# Patient Record
Sex: MALE | Race: WHITE
[De-identification: names, ages, dates, MRNs, and addresses within clinical notes are randomized per-mention and may not be internally consistent; named-entity substitution may affect disease eponyms.]

---

## 2018-12-22 ENCOUNTER — HOSPITAL ENCOUNTER (INPATIENT)
Dept: HOSPITAL 75 - ER | Age: 43
LOS: 1 days | Discharge: HOME | DRG: 312 | End: 2018-12-23
Attending: INTERNAL MEDICINE | Admitting: INTERNAL MEDICINE
Payer: MEDICARE

## 2018-12-22 VITALS — SYSTOLIC BLOOD PRESSURE: 124 MMHG | DIASTOLIC BLOOD PRESSURE: 94 MMHG

## 2018-12-22 VITALS — BODY MASS INDEX: 33.11 KG/M2 | WEIGHT: 175.37 LBS | HEIGHT: 61 IN

## 2018-12-22 VITALS — DIASTOLIC BLOOD PRESSURE: 80 MMHG | SYSTOLIC BLOOD PRESSURE: 107 MMHG

## 2018-12-22 DIAGNOSIS — M62.81: ICD-10-CM

## 2018-12-22 DIAGNOSIS — F41.9: ICD-10-CM

## 2018-12-22 DIAGNOSIS — I95.2: Primary | ICD-10-CM

## 2018-12-22 DIAGNOSIS — H91.3: ICD-10-CM

## 2018-12-22 DIAGNOSIS — F15.288: ICD-10-CM

## 2018-12-22 DIAGNOSIS — K59.09: ICD-10-CM

## 2018-12-22 DIAGNOSIS — Z74.01: ICD-10-CM

## 2018-12-22 DIAGNOSIS — R00.1: ICD-10-CM

## 2018-12-22 DIAGNOSIS — E11.9: ICD-10-CM

## 2018-12-22 DIAGNOSIS — I10: ICD-10-CM

## 2018-12-22 DIAGNOSIS — T46.4X5A: ICD-10-CM

## 2018-12-22 DIAGNOSIS — F63.81: ICD-10-CM

## 2018-12-22 DIAGNOSIS — Q76.1: ICD-10-CM

## 2018-12-22 DIAGNOSIS — E78.5: ICD-10-CM

## 2018-12-22 DIAGNOSIS — T46.6X5A: ICD-10-CM

## 2018-12-22 DIAGNOSIS — F32.9: ICD-10-CM

## 2018-12-22 DIAGNOSIS — F70: ICD-10-CM

## 2018-12-22 LAB
ALBUMIN SERPL-MCNC: 3.6 GM/DL (ref 3.2–4.5)
ALP SERPL-CCNC: 66 U/L (ref 40–136)
ALT SERPL-CCNC: 24 U/L (ref 0–55)
APTT BLD: 34 SEC (ref 24–35)
APTT PPP: YELLOW S
BACTERIA #/AREA URNS HPF: NEGATIVE /HPF
BASOPHILS # BLD AUTO: 0.1 10^3/UL (ref 0–0.1)
BASOPHILS NFR BLD AUTO: 1 % (ref 0–10)
BILIRUB SERPL-MCNC: 0.2 MG/DL (ref 0.1–1)
BILIRUB UR QL STRIP: NEGATIVE
BUN/CREAT SERPL: 31
CALCIUM SERPL-MCNC: 8.7 MG/DL (ref 8.5–10.1)
CHLORIDE SERPL-SCNC: 110 MMOL/L (ref 98–107)
CO2 SERPL-SCNC: 21 MMOL/L (ref 21–32)
CREAT SERPL-MCNC: 0.75 MG/DL (ref 0.6–1.3)
EOSINOPHIL # BLD AUTO: 0.7 10^3/UL (ref 0–0.3)
EOSINOPHIL NFR BLD AUTO: 7 % (ref 0–10)
ERYTHROCYTE [DISTWIDTH] IN BLOOD BY AUTOMATED COUNT: 12.1 % (ref 10–14.5)
FIBRINOGEN PPP-MCNC: CLEAR MG/DL
GFR SERPLBLD BASED ON 1.73 SQ M-ARVRAT: > 60 ML/MIN
GLUCOSE SERPL-MCNC: 129 MG/DL (ref 70–105)
GLUCOSE UR STRIP-MCNC: NEGATIVE MG/DL
HCT VFR BLD CALC: 38 % (ref 40–54)
HGB BLD-MCNC: 13.1 G/DL (ref 13.3–17.7)
INR PPP: 1 (ref 0.8–1.4)
KETONES UR QL STRIP: NEGATIVE
LEUKOCYTE ESTERASE UR QL STRIP: NEGATIVE
LYMPHOCYTES # BLD AUTO: 4.2 X 10^3 (ref 1–4)
LYMPHOCYTES NFR BLD AUTO: 43 % (ref 12–44)
MAGNESIUM SERPL-MCNC: 2 MG/DL (ref 1.8–2.4)
MANUAL DIFFERENTIAL PERFORMED BLD QL: NO
MCH RBC QN AUTO: 31 PG (ref 25–34)
MCHC RBC AUTO-ENTMCNC: 34 G/DL (ref 32–36)
MCV RBC AUTO: 92 FL (ref 80–99)
MONOCYTES # BLD AUTO: 0.7 X 10^3 (ref 0–1)
MONOCYTES NFR BLD AUTO: 7 % (ref 0–12)
MYOGLOBIN SERPL-MCNC: 28.8 NG/ML (ref 10–92)
NEUTROPHILS # BLD AUTO: 4.3 X 10^3 (ref 1.8–7.8)
NEUTROPHILS NFR BLD AUTO: 44 % (ref 42–75)
NITRITE UR QL STRIP: NEGATIVE
PH UR STRIP: 6.5 [PH] (ref 5–9)
PLATELET # BLD: 163 10^3/UL (ref 130–400)
PMV BLD AUTO: 9.8 FL (ref 7.4–10.4)
POTASSIUM SERPL-SCNC: 3.9 MMOL/L (ref 3.6–5)
PROT SERPL-MCNC: 6.1 GM/DL (ref 6.4–8.2)
PROT UR QL STRIP: NEGATIVE
PROTHROMBIN TIME: 13.3 SEC (ref 12.2–14.7)
RBC # BLD AUTO: 4.18 10^6/UL (ref 4.35–5.85)
RBC #/AREA URNS HPF: (no result) /HPF
SODIUM SERPL-SCNC: 139 MMOL/L (ref 135–145)
SP GR UR STRIP: 1.01 (ref 1.02–1.02)
UROBILINOGEN UR-MCNC: NORMAL MG/DL
WBC # BLD AUTO: 9.8 10^3/UL (ref 4.3–11)
WBC #/AREA URNS HPF: (no result) /HPF

## 2018-12-22 PROCEDURE — 87804 INFLUENZA ASSAY W/OPTIC: CPT

## 2018-12-22 PROCEDURE — 87088 URINE BACTERIA CULTURE: CPT

## 2018-12-22 PROCEDURE — 93005 ELECTROCARDIOGRAM TRACING: CPT

## 2018-12-22 PROCEDURE — 80053 COMPREHEN METABOLIC PANEL: CPT

## 2018-12-22 PROCEDURE — 83874 ASSAY OF MYOGLOBIN: CPT

## 2018-12-22 PROCEDURE — 82962 GLUCOSE BLOOD TEST: CPT

## 2018-12-22 PROCEDURE — 71046 X-RAY EXAM CHEST 2 VIEWS: CPT

## 2018-12-22 PROCEDURE — 84484 ASSAY OF TROPONIN QUANT: CPT

## 2018-12-22 PROCEDURE — 36415 COLL VENOUS BLD VENIPUNCTURE: CPT

## 2018-12-22 PROCEDURE — 85610 PROTHROMBIN TIME: CPT

## 2018-12-22 PROCEDURE — 96365 THER/PROPH/DIAG IV INF INIT: CPT

## 2018-12-22 PROCEDURE — 87040 BLOOD CULTURE FOR BACTERIA: CPT

## 2018-12-22 PROCEDURE — 85730 THROMBOPLASTIN TIME PARTIAL: CPT

## 2018-12-22 PROCEDURE — 96361 HYDRATE IV INFUSION ADD-ON: CPT

## 2018-12-22 PROCEDURE — 81000 URINALYSIS NONAUTO W/SCOPE: CPT

## 2018-12-22 PROCEDURE — 83735 ASSAY OF MAGNESIUM: CPT

## 2018-12-22 PROCEDURE — 80048 BASIC METABOLIC PNL TOTAL CA: CPT

## 2018-12-22 PROCEDURE — 83605 ASSAY OF LACTIC ACID: CPT

## 2018-12-22 PROCEDURE — 93041 RHYTHM ECG TRACING: CPT

## 2018-12-22 PROCEDURE — 74018 RADEX ABDOMEN 1 VIEW: CPT

## 2018-12-22 PROCEDURE — 85025 COMPLETE CBC W/AUTO DIFF WBC: CPT

## 2018-12-22 RX ADMIN — SODIUM CHLORIDE SCH MLS/HR: 900 INJECTION, SOLUTION INTRAVENOUS at 22:43

## 2018-12-22 NOTE — XMS REPORT
Continuity of Care Document

 Created on: 2018



CLEMENTE ALEX

External Reference #: [id number]

: 1975

Sex: Male



Demographics







 Address  [address]

[place]  [address]

 

 Home Phone  [phone] x

 

 Preferred Language  Unknown

 

 Marital Status  Unknown

 

 Yazidism Affiliation  Unknown

 

 Race  Unknown

 

 Ethnic Group  Unknown





Author







 Author  [facility]

 

 Address  Unknown

 

 Phone  Unavailable



              



Allergies

      



There is no data.                  



Medications

      



There is no data.                  



Problems

      





 Date Dx Coded            Attending            Type            Code            
Diagnosis            Diagnosed By        

 

 2014            SUNIL GAN                         300.3       
     AN OBCESS COMP DIS                     

 

 2014            SUNIL GAN                         312.34      
      INTERMITTENT EXPLOSIVE DISORDER                     

 

 2014            SUNIL GAN                         318.1       
     SEVERE MENTAL RETARDATION                     

 

 2014            SUNIL GAN                         V58.69      
      MEDICATION HIGH RISK                     

 

 2014            SUNIL GAN                         300.3       
     AN OBCESS COMP DIS                     

 

 2014            SUNIL GAN                         312.34      
      INTERMITTENT EXPLOSIVE DISORDER                     

 

 2014            SUNIL GAN                         318.1       
     SEVERE MENTAL RETARDATION                     

 

 2014            SUNIL GAN                         V58.69      
      MEDICATION HIGH RISK                     



                                



Procedures

      



There is no data.                  



Results

      



There is no data.              



Encounters

      





 ACCT No.            Visit Date/Time            Discharge            Status    
        Pt. Type            Provider            Facility            Loc./Unit  
          Complaint        

 

 [id number]            2015 15:35:00            2015 23:59:59          
  [facility]            Outpatient            SUNIL GAN                     
                          

 

 [id number]            2014 11:22:00            2014 23:59:59          
  [facility]            Outpatient            SUNIL GAN

## 2018-12-22 NOTE — DIAGNOSTIC IMAGING REPORT
INDICATION: Lethargic.



COMPARISON: 11/22/2016.



TECHNIQUE: Two radiographs of the chest dated December 22, 2018. 



FINDINGS: The cardiac silhouette is within normal limits in size.

No significant pulmonary vascular congestion. The lungs are clear

of focal pulmonary opacity. No pleural effusion. No pneumothorax.

Surgical clips are within the right upper quadrant of the

abdomen. No acute osseous abnormality.



IMPRESSION: Stable examination without acute cardiopulmonary

abnormality. 



Dictated by: 



  Dictated on workstation # [id number]

## 2018-12-22 NOTE — ED GENERAL
General


Stated Complaint:  LETHARGIC/WEAK


Source of Information:  Patient, Caregiver, Old Records


Exam Limitations:  Physical Impairments





History of Present Illness


Date Seen by Provider:  Dec 22, 2018


Time Seen by Provider:  18:00


Initial Comments


The patient presents to ER by private conveyance with his house caregiver 

because they noticed today he was not acting himself is been very tired and 

wanting to sleep all day and at dinner was dropping his utensils. He's not been 

acting sick lately nor there any sick contacts in the home where he lives. He 

does have a history of being functionally destined nonverbal. The has a 

guardian who the caregivers trying to contact. They deny that he's been having 

any nausea vomiting diarrhea fevers chills sweats pain of any sort or decreased 

appetite until this evening. He's not been having a cough, runny nose rash or 

other concern that they're aware of other than his acting weak and tired. He 

has a history of hypertension and some psychiatric medications that he takes. 

They state he's been taking his medications appropriately since they deliver 

them to him. No known family history as they don't do anything about his 

family. No history of heart attack, stroke, or recent antibiotic use. They 

noticed the last year he's lost some weight but they are not sure how much.


Last bowel movement was just prior to arrival.





Allergies and Home Medications


Allergies


Coded Allergies:  


     No Known Drug Allergies (Unverified , 11)





Home Medications


Hydrocodone/Acetaminophen 1 Each Tablet, 1-2 EACH PO Q4H


   Prescribed by: REBECCA HUANG on 16 1002





Patient Home Medication List


Home Medication List Reviewed:  Yes





Review of Systems


Review of Systems


Constitutional:  see HPI (history of present illness given by caregivers as the 

patient is nonverbal); No chills, No diaphoresis, No fever, No malaise


EENTM:  No ear discharge, No ear pain, No eye pain, No tearing, No dental 

problems, No hoarseness, No nose congestion, No throat pain


Respiratory:  No cough, No short of breath, No wheezing


Cardiovascular:  No chest pain, No edema, No Hx of Intervention, No syncope, No 

vascular heart diseas


Gastrointestinal:  No abdominal pain, No constipation, No diarrhea, No vomiting


Genitourinary:  No dysuria, No frequency, No hematuria


Musculoskeletal:  No back pain, No joint pain


Skin:  No pruritus, No rash


Psychiatric/Neurological:  Denies Headache, Denies Numbness





Past Medical-Social-Family Hx


Patient Social History


Alcohol Use:  Denies Use


Recreational Drug Use:  No


Smoking Status:  Never a Smoker


Recent Foreign Travel:  No


Contact w/Someone Who Travel:  No





Seasonal Allergies


Seasonal Allergies:  No





Past Medical History


Gall Bladder Disease


Hearing Impairment:  Deaf





Physical Exam-Suspected Sepsis


Physical Exam


Vital Signs





Vital Signs - First Documented








 18





 18:04


 


Temp 95.9


 


Pulse 57


 


Resp 20


 


B/P (MAP) 82/53 (63)


 


Pulse Ox 94


 


O2 Delivery Room Air





Capillary Refill :


Height, Weight, BMI


Height: 5'1.00"


Weight: 290lbs. 0.0oz. 131.804274im; 48.25 BMI


Method:Stated


General Appearance:  Mild Distress, Obese


Eyes:  Bilateral Eye Normal Inspection, Bilateral Eye PERRL, Bilateral Eye EOMI


HEENT:  PERRL/EOMI, TMs Normal, Normal ENT Inspection, Pharynx Normal, Moist 

Mucous Membranes


Neck:  Full Range of Motion, Normal Inspection, Non Tender


Respiratory:  Chest Non Tender, Lungs Clear, Normal Breath Sounds, No Accessory 

Muscle Use, No Respiratory Distress


Cardiovascular:  Regular Rate, Rhythm, No Edema, Normal Peripheral Pulses


Gastrointestinal:  Normal Bowel Sounds, No Organomegaly, Non Tender, Soft


Extremity:  Normal Capillary Refill, Normal Inspection


Neurologic/Psychiatric:  Alert, Normal Mood/Affect (placid), Other (nonverbal 

but cooperative)


Skin:  normal color, warm/dry





Focused Exam


Lactate Level


18 18:37: Lactic Acid Level 0.86





Lactic Acid Level





Laboratory Tests








Test


 18


18:37


 


Lactic Acid Level


 0.86 MMOL/L


(0.50-2.00)











Progress/Results/Core Measures


Suspected Sepsis


SIRS


Temperature: 


Pulse:  


Respiratory Rate: 


 


Laboratory Tests


18 18:37: White Blood Count 9.8


Blood Pressure  / 


Mean: 


 





18 18:37: Lactic Acid Level 0.86


Laboratory Tests


18 18:37: 


Creatinine 0.75, INR Comment 1.0, Platelet Count 163, Total Bilirubin 0.2








Results/Orders


Lab Results





Laboratory Tests








Test


 18


18:37 18


18:52 18


19:41 Range/Units


 


 


White Blood Count


 9.8 


 


 


 4.3-11.0


10^3/uL


 


Red Blood Count


 4.18 L


 


 


 4.35-5.85


10^6/uL


 


Hemoglobin 13.1 L   13.3-17.7  G/DL


 


Hematocrit 38 L   40-54  %


 


Mean Corpuscular Volume 92    80-99  FL


 


Mean Corpuscular Hemoglobin 31    25-34  PG


 


Mean Corpuscular Hemoglobin


Concent 34 


 


 


 32-36  G/DL





 


Red Cell Distribution Width 12.1    10.0-14.5  %


 


Platelet Count


 163 


 


 


 130-400


10^3/uL


 


Mean Platelet Volume 9.8    7.4-10.4  FL


 


Neutrophils (%) (Auto) 44    42-75  %


 


Lymphocytes (%) (Auto) 43    12-44  %


 


Monocytes (%) (Auto) 7    0-12  %


 


Eosinophils (%) (Auto) 7    0-10  %


 


Basophils (%) (Auto) 1    0-10  %


 


Neutrophils # (Auto) 4.3    1.8-7.8  X 10^3


 


Lymphocytes # (Auto) 4.2 H   1.0-4.0  X 10^3


 


Monocytes # (Auto) 0.7    0.0-1.0  X 10^3


 


Eosinophils # (Auto)


 0.7 H


 


 


 0.0-0.3


10^3/uL


 


Basophils # (Auto)


 0.1 


 


 


 0.0-0.1


10^3/uL


 


Prothrombin Time 13.3    12.2-14.7  SEC


 


INR Comment 1.0    0.8-1.4  


 


Activated Partial


Thromboplast Time 34 


 


 


 24-35  SEC





 


Sodium Level 139    135-145  MMOL/L


 


Potassium Level 3.9    3.6-5.0  MMOL/L


 


Chloride Level 110 H     MMOL/L


 


Carbon Dioxide Level 21    21-32  MMOL/L


 


Anion Gap 8    5-14  MMOL/L


 


Blood Urea Nitrogen 23 H   7-18  MG/DL


 


Creatinine


 0.75 


 


 


 0.60-1.30


MG/DL


 


Estimat Glomerular Filtration


Rate > 60 


 


 


  





 


BUN/Creatinine Ratio 31     


 


Glucose Level 129 H     MG/DL


 


Lactic Acid Level


 0.86 


 


 


 0.50-2.00


MMOL/L


 


Calcium Level 8.7    8.5-10.1  MG/DL


 


Corrected Calcium 9.0    8.5-10.1  MG/DL


 


Magnesium Level 2.0    1.8-2.4  MG/DL


 


Total Bilirubin 0.2    0.1-1.0  MG/DL


 


Aspartate Amino Transf


(AST/SGOT) 23 


 


 


 5-34  U/L





 


Alanine Aminotransferase


(ALT/SGPT) 24 


 


 


 0-55  U/L





 


Alkaline Phosphatase 66      U/L


 


Myoglobin


 28.8 


 


 


 10.0-92.0


NG/ML


 


Troponin I < 0.30    <0.30  NG/ML


 


Total Protein 6.1 L   6.4-8.2  GM/DL


 


Albumin 3.6    3.2-4.5  GM/DL


 


Glucometer  104     MG/DL


 


Urine Color   YELLOW   


 


Urine Clarity   CLEAR   


 


Urine pH   6.5  5-9  


 


Urine Specific Gravity   1.010 L 1.016-1.022  


 


Urine Protein   NEGATIVE  NEGATIVE  


 


Urine Glucose (UA)   NEGATIVE  NEGATIVE  


 


Urine Ketones   NEGATIVE  NEGATIVE  


 


Urine Nitrite   NEGATIVE  NEGATIVE  


 


Urine Bilirubin   NEGATIVE  NEGATIVE  


 


Urine Urobilinogen   NORMAL  NORMAL  MG/DL


 


Urine Leukocyte Esterase   NEGATIVE  NEGATIVE  


 


Urine RBC (Auto)   NEGATIVE  NEGATIVE  


 


Urine RBC   NONE   /HPF


 


Urine WBC   NONE   /HPF


 


Urine Crystals   NONE   /LPF


 


Urine Bacteria   NEGATIVE   /HPF


 


Urine Casts   NONE   /LPF


 


Urine Mucus   NEGATIVE   /LPF


 


Urine Culture Indicated   NO   








Micro Results





Microbiology


18 Influenza Types A,B Antigen (NAJMA) - Final, Complete


           





My Orders





Orders - NARCISA CHOW


Cbc With Automated Diff (18 18:09)


Comprehensive Metabolic Panel (18 18:09)


Blood Culture (18 18:09)


Sputum Culture (18 18:09)


Urinalysis (18 18:09)


Urine Culture (18 18:09)


Protime With Inr (18 18:09)


Partial Thromboplastin Time (18 18:09)


Saline Lock/Iv-Start (18 18:09)


Saline Lock/Iv-Start (18 18:09)


Vital Signs Adult Sepsis Patie Q15M (18 18:09)


O2 (18 18:09)


Remove Rings In Anticipation O (18 18:09)


Lactic Acid Analyzer (18 18:09)


Influenza A And B Antigens (18 18:09)


Ns Iv 1000 Ml (Sodium Chloride 0.9%) (18 18:15)


Piperacillin Sodium/Tazobactam (Zosyn Vi (18 18:15)


Ekg Tracing (18 18:09)


Monitor-Rhythm Ecg Trace Only (18 18:09)


Aspirin Chewable Tablet (Baby Aspirin Ch (18 18:15)


Chest Pa/Lat (2 View) (18 18:09)


Accucheck Stat ONCE (18 18:50)


Cardiac Profile 1 (18 18:37)


Magnesium (18 18:37)


Myoglobin Serum (18 18:37)


Abdomen/Kub 1view (18 20:16)





Medications Given in ED





Current Medications








 Medications  Dose


 Ordered  Sig/Farrukh


 Route  Start Time


 Stop Time Status Last Admin


Dose Admin


 


 Aspirin  324 mg  ONCE  ONCE


 PO  18 18:15


 18 18:17 DC 18 18:30


324 MG


 


 Piperacillin Sod/


 Tazobactam Sod


 4.5 gm/Sodium


 Chloride  100 ml @ 


 200 mls/hr  ONCE  ONCE


 IV  18 18:15


 18 18:44 DC 18 19:59


200 MLS/HR


 


 Sodium Chloride  2,000 ml @ 


 2,000 mls/hr  ONCE  ONCE


 IV  18 18:15


 18 19:14 DC 18 18:30


2,000 MLS/HR








Vital Signs/I&O











 18





 18:04 20:03


 


Temp 95.9 95.4


 


Pulse 57 44


 


Resp 20 16


 


B/P (MAP) 82/53 (63) 92/60


 


Pulse Ox 94 95


 


O2 Delivery Room Air Room Air





Capillary Refill :


Progress Note #1:  


   Time:  18:22


Progress Note


Not much history to go on; the patient is found to be hypotensive initially 

with a systolic of 85 however blood pressure was noted to be 129/96 after 

obtaining flu swab and IVs which irritated the patient. His heart rates was in 

the 50s and he is not on a beta blocker, digoxin etc. so it could be artificial 

hypotension. He got up and walked over got in the chair to go to radiology with 

one-person standby assist. His temperature is low sonwe are still going to 

treat it like sepsis until we see otherwise. He has not started his IV fluids 

yet so when he gets back from radiology we will recheck his blood pressure 

couple times and if it's not hypotensive then this would not represent shock. We

'll also considering possibility of cardiac so we obtain an EKG we'll give him 

324 mg of aspirin and get a troponin. He has not intimated any kind of chest 

pain rather it was merely the presence of hypotension with no good history that 

prompted this workup. Using an ideal body weight of 150 pounds and put him 

right at 2 L of saline for 30 mils per kilogram. He has no edema, JVD or 

orthopnea and clinically he does appear to be mildly dry but not excessively so.





2016 Dr. Huang the care of him for gallbladder surgery and a history and 

physical reveals a past medical history of mild mental retardation with Klippel-

Feil syndrome, Obsessive-compulsive disorder, intermittent explosive disorder, 

deaf-mute doesn't, chronic constipation, high cholesterol, diabetes mellitus. 

His medical reconciliation does not include any insulin. No other surgical 

history. No smoking or alcohol use.


Progress Note #2:  


   Time:  18:35


Progress Note


Repeat blood pressure still demonstrate hypotension. His EKG today looks okay 

but he does have a history of possible atrial flutter seen on an EKG couple 

years ago. He showing no neurologic symptoms but is difficult to do a complete 

examination given his mutism. He has no drooping of his face or difficulty with 

one side of his body. The other. Strength and motor are equal bilateral.


Progress Note #3:  


   Time:  19:27


Progress Note


Bedside blood sugar unremarkable 104. Heart rate still remains low in the mid 

40s but his blood pressure has improved significantly by about 20 mmHg systolic 

the right now 107/67. Could be a cardiac source of his hypotension. Troponin 

and other lab work unrevealing. By the time he gets his first 2 L in if he 

still not able to produce a urine then we'll collect a straight catheter 

specimen.


Progress Note #4:  


   Time:  20:13


Progress Note


Patient's blood pressure is 116/93 and dental responded to fluid bolus 

challenge. He doesn't appear to be in any kind of distress right now on 

reexamination. On focused exam at this time is markedly improved. The I do 

think it be wise to hold onto him in the hospital do some further workup to 

find out why he is having inappropriate bradycardia and hypertension if it was 

from an infectious source or if there is a cardiogenic source of his problems 

today. Would also consider the possibility he may have ingested something but I 

as of yet have not been able to discern by history what that might be. I would 

continue the antibiotics until we were certain there is no infectious source of 

his symptoms. He does have a history of constipation so we will obtain a KUB 1 

view just to see if he has a large stool load.





ECG


Initial ECG Impression Date:  Dec 22, 2018


Initial ECG Impression Time:  18:09


Initial ECG Rate:  70


Initial ECG Rhythm:  Normal Sinus


Initial ECG Intervals:  Normal


Initial ECG Impression:  Normal, Nonspecific Changes


Initial ECG Comparisson:  Changed


Comment


Previous EKG from 2016 may have shown atrial flutter versus artifact. Today he 

is in a normal sinus rhythm without any ST changes.





Diagnostic Imaging





   Diagonstic Imaging:  Xray


   Plain Films/CT/US/NM/MRI:  chest (2v)


Comments


 [facility].


 [place], [place]





NAME:   LEONEL ALEX


[facility] REC#:   [id number]


ACCOUNT#:   [id number]


PT STATUS:   REG ER


:   1975


PHYSICIAN:   NARCISA CHOW MD


ADMIT DATE:   18/ER


 ***Draft***


Date of Exam:18





CHEST PA/LAT (2 VIEW)








INDICATION: Lethargic.





COMPARISON: 2016.





TECHNIQUE: Two radiographs of the chest dated 2018. 





FINDINGS: The cardiac silhouette is within normal limits in size.


No significant pulmonary vascular congestion. The lungs are clear


of focal pulmonary opacity. No pleural effusion. No pneumothorax.


Surgical clips are within the right upper quadrant of the


abdomen. No acute osseous abnormality.





IMPRESSION: Stable examination without acute cardiopulmonary


abnormality. 





  Dictated on workstation # [id number]








Dict:   18


Trans:   18


[facility] [id number]





Interpreted by:     CHIDI HORVATH MD


Electronically signed by:


   Reviewed:  Reviewed by Me








   Diagonstic Imaging:  Xray


   Plain Films/CT/US/NM/MRI:  abdomen (1 view KUB)


Comments


Unremarkable bowel gas pattern.


   Reviewed:  Reviewed by Me





Departure


Communication (Admissions)


Time/Spoke to Admitting Phy:  20:30


Dr Shipley; discussed case lab EKG troponin and x-rays. She would like the 

patient on cardiac stepdown with telemetry and she agrees to accept him.


Time/Spoke to Consulting Phy:  20:35


Dr. Tatum; discussed case lab imaging findings he agrees to consult.





Impression





 Primary Impression:  


 Hypotension


 Qualified Codes:  I95.0 - Idiopathic hypotension


 Additional Impression:  


 Bradycardia with 41-50 beats per minute


Disposition:   ADMITTED AS INPATIENT


Condition:  Stable





Admissions


Decision to Admit Reason:  Admit from ER (General)


Decision to Admit/Date:  Dec 22, 2018


Time/Decision to Admit Time:  20:30





Departure-Patient Inst.


Referrals:  


KAYLA DE LA CRUZ DO (PCP/Family)


Primary Care Physician





Copy


Copies To 1:   KAYLA DE LA CRUZ TITUS J Dec 22, 2018 18:22

## 2018-12-22 NOTE — DIAGNOSTIC IMAGING REPORT
INDICATION: Lethargic



COMPARISON: None available.



TECHNIQUE: Single radiograph of the abdomen dated 12/22/2018.



FINDINGS: Surgical clips are present within the right upper

quadrant of the abdomen. Gas and stool is identified throughout

the colon. No dilated loops of small bowel. No differential

air-fluid levels. No free air. [place] left curvature of the spine.

No acute osseous abnormality.



IMPRESSION: No acute abnormality.



Dictated by: 



  Dictated on workstation # [id number]

## 2018-12-22 NOTE — XMS REPORT
Continuity of Care Document

 Created on: 2018



CLEMENTE ALEX

External Reference #: [id number]

: 1975

Sex: Male



Demographics







 Address  [address]

[place]  [address]

 

 Home Phone  [phone] x

 

 Preferred Language  Unknown

 

 Marital Status  Unknown

 

 Zoroastrian Affiliation  Unknown

 

 Race  Unknown

 

 Ethnic Group  Unknown





Author







 Author  [facility]

 

 Address  Unknown

 

 Phone  Unavailable



              



Allergies

      



There is no data.                  



Medications

      



There is no data.                  



Problems

      





 Date Dx Coded            Attending            Type            Code            
Diagnosis            Diagnosed By        

 

 2014            SUNIL GAN                         300.3       
     AN OBCESS COMP DIS                     

 

 2014            SUNIL GAN                         312.34      
      INTERMITTENT EXPLOSIVE DISORDER                     

 

 2014            SUNIL GAN                         318.1       
     SEVERE MENTAL RETARDATION                     

 

 2014            SUNIL GAN                         V58.69      
      MEDICATION HIGH RISK                     

 

 2014            SUNIL GAN                         300.3       
     AN OBCESS COMP DIS                     

 

 2014            SUNIL GAN                         312.34      
      INTERMITTENT EXPLOSIVE DISORDER                     

 

 2014            SUNIL GAN                         318.1       
     SEVERE MENTAL RETARDATION                     

 

 2014            SUNIL GAN                         V58.69      
      MEDICATION HIGH RISK                     



                                



Procedures

      



There is no data.                  



Results

      



There is no data.              



Encounters

      





 ACCT No.            Visit Date/Time            Discharge            Status    
        Pt. Type            Provider            Facility            Loc./Unit  
          Complaint        

 

 [id number]            2015 15:35:00            2015 23:59:59          
  [facility]            Outpatient            SUNIL GAN                     
                          

 

 [id number]            2014 11:22:00            2014 23:59:59          
  [facility]            Outpatient            SUNIL GAN

## 2018-12-23 VITALS — DIASTOLIC BLOOD PRESSURE: 63 MMHG | SYSTOLIC BLOOD PRESSURE: 114 MMHG

## 2018-12-23 VITALS — SYSTOLIC BLOOD PRESSURE: 106 MMHG | DIASTOLIC BLOOD PRESSURE: 55 MMHG

## 2018-12-23 VITALS — SYSTOLIC BLOOD PRESSURE: 109 MMHG | DIASTOLIC BLOOD PRESSURE: 61 MMHG

## 2018-12-23 VITALS — DIASTOLIC BLOOD PRESSURE: 62 MMHG | SYSTOLIC BLOOD PRESSURE: 121 MMHG

## 2018-12-23 VITALS — DIASTOLIC BLOOD PRESSURE: 54 MMHG | SYSTOLIC BLOOD PRESSURE: 114 MMHG

## 2018-12-23 LAB
BASOPHILS # BLD AUTO: 0 10^3/UL (ref 0–0.1)
BASOPHILS NFR BLD AUTO: 1 % (ref 0–10)
BUN/CREAT SERPL: 26
CALCIUM SERPL-MCNC: 8.2 MG/DL (ref 8.5–10.1)
CHLORIDE SERPL-SCNC: 118 MMOL/L (ref 98–107)
CO2 SERPL-SCNC: 19 MMOL/L (ref 21–32)
CREAT SERPL-MCNC: 0.7 MG/DL (ref 0.6–1.3)
EOSINOPHIL # BLD AUTO: 0.6 10^3/UL (ref 0–0.3)
EOSINOPHIL NFR BLD AUTO: 7 % (ref 0–10)
ERYTHROCYTE [DISTWIDTH] IN BLOOD BY AUTOMATED COUNT: 12 % (ref 10–14.5)
GFR SERPLBLD BASED ON 1.73 SQ M-ARVRAT: > 60 ML/MIN
GLUCOSE SERPL-MCNC: 92 MG/DL (ref 70–105)
HCT VFR BLD CALC: 35 % (ref 40–54)
HGB BLD-MCNC: 11.8 G/DL (ref 13.3–17.7)
LYMPHOCYTES # BLD AUTO: 3.6 X 10^3 (ref 1–4)
LYMPHOCYTES NFR BLD AUTO: 42 % (ref 12–44)
MANUAL DIFFERENTIAL PERFORMED BLD QL: NO
MCH RBC QN AUTO: 31 PG (ref 25–34)
MCHC RBC AUTO-ENTMCNC: 33 G/DL (ref 32–36)
MCV RBC AUTO: 93 FL (ref 80–99)
MONOCYTES # BLD AUTO: 1 X 10^3 (ref 0–1)
MONOCYTES NFR BLD AUTO: 11 % (ref 0–12)
NEUTROPHILS # BLD AUTO: 3.6 X 10^3 (ref 1.8–7.8)
NEUTROPHILS NFR BLD AUTO: 40 % (ref 42–75)
PLATELET # BLD: 121 10^3/UL (ref 130–400)
PMV BLD AUTO: 9.9 FL (ref 7.4–10.4)
POTASSIUM SERPL-SCNC: 4 MMOL/L (ref 3.6–5)
RBC # BLD AUTO: 3.78 10^6/UL (ref 4.35–5.85)
SODIUM SERPL-SCNC: 145 MMOL/L (ref 135–145)
WBC # BLD AUTO: 8.8 10^3/UL (ref 4.3–11)

## 2018-12-23 RX ADMIN — SODIUM CHLORIDE SCH MLS/HR: 900 INJECTION INTRAVENOUS at 02:10

## 2018-12-23 RX ADMIN — SODIUM CHLORIDE SCH MLS/HR: 900 INJECTION INTRAVENOUS at 10:24

## 2018-12-23 RX ADMIN — SODIUM CHLORIDE SCH MLS/HR: 900 INJECTION, SOLUTION INTRAVENOUS at 01:59

## 2018-12-23 RX ADMIN — SODIUM CHLORIDE SCH MLS/HR: 900 INJECTION, SOLUTION INTRAVENOUS at 05:58

## 2018-12-23 NOTE — CONSULTATION-CARDIOLOGY
HPI-Cardiology


Cardiology Consultation


Date of Consultation


18


Date of Admission





Time Seen by Provider:  09:54


Indication:  bradycardia, hypotension





HPI


43 years old gentleman with history of hypertension, hyperlipidemia, patient 

suffered from deafness, unable to provide any history.  It was reported by his 

caregiver that he has been tired and having no energy, did not eat well.  

Brought to the hospital and noted to be borderline hypotensive.  He is on 

lisinopril as an outpatient and he received yesterday morning.  Overnight he 

continued to have borderline hypotension, heart rate is in the 50s.  Laying 

down in bed, did not respond properly, unable to provide any history.





Home Medications & Allergies


Allergies:  


Coded Allergies:  


     No Known Drug Allergies (Unverified , 18)


Home Medication List Reviewed:  Yes





PMH-Social-Family Hx


Patient Social History


Marital Status:  single


Employed/Student:  unemployed


Alcohol Use:  Denies Use


Recreational Drug Use:  No


Smoking Status:  Never a Smoker


Recent Foreign Travel:  No


Recent Infectious Disease Expo:  No


Physical Abuse Screen:  No


Sexual Abuse:  No





Immunizations Up To Date


Tetanus Booster (TDap):  Unknown


Date of Influenza Vaccine:  Oct 1, 2018





Past Medical History


past medical history as discussed below





Review of Systems


Constitutional:  malaise, weakness, other (unable to provide history due to his 

current condition)


Respiratory:  No see HPI, No cough, No dyspnea on exertion, No hemoptysis, No 

orthopnea, No phlegm, No short of breath, No stridor, No wheezing, No other


Cardiovascular:  No see HPI, No chest pain, No edema, No Hx of Intervention, No 

palpitations, No syncope, No vascular heart diseas, No other


Gastrointestinal:  No RUQ, No LUQ, No RLQ, No LLQ, No see HPI, No abdominal pain

, No constipation, No diarrhea, No dysphagia, No hematemesis, No heartburn, No 

jaundice, No loss of appetite, No melena, No nausea, No vomiting, No other





Reviewed Test Results


Reviewed Test Results


Lab





Laboratory Tests








Test


 18


18:37 18


18:52 18


19:41 18


03:49 Range/Units


 


 


White Blood Count


 9.8 


 


 


 8.8 


 4.3-11.0


10^3/uL


 


Red Blood Count


 4.18 L


 


 


 3.78 L


 4.35-5.85


10^6/uL


 


Hemoglobin 13.1 L   11.8 L 13.3-17.7  G/DL


 


Hematocrit 38 L   35 L 40-54  %


 


Mean Corpuscular Volume 92    93  80-99  FL


 


Mean Corpuscular Hemoglobin 31    31  25-34  PG


 


Mean Corpuscular Hemoglobin


Concent 34 


 


 


 33 


 32-36  G/DL





 


Red Cell Distribution Width 12.1    12.0  10.0-14.5  %


 


Platelet Count


 163 


 


 


 121 L


 130-400


10^3/uL


 


Mean Platelet Volume 9.8    9.9  7.4-10.4  FL


 


Neutrophils (%) (Auto) 44    40 L 42-75  %


 


Lymphocytes (%) (Auto) 43    42  12-44  %


 


Monocytes (%) (Auto) 7    11  0-12  %


 


Eosinophils (%) (Auto) 7    7  0-10  %


 


Basophils (%) (Auto) 1    1  0-10  %


 


Neutrophils # (Auto) 4.3    3.6  1.8-7.8  X 10^3


 


Lymphocytes # (Auto) 4.2 H   3.6  1.0-4.0  X 10^3


 


Monocytes # (Auto) 0.7    1.0  0.0-1.0  X 10^3


 


Eosinophils # (Auto)


 0.7 H


 


 


 0.6 H


 0.0-0.3


10^3/uL


 


Basophils # (Auto)


 0.1 


 


 


 0.0 


 0.0-0.1


10^3/uL


 


Prothrombin Time 13.3     12.2-14.7  SEC


 


INR Comment 1.0     0.8-1.4  


 


Activated Partial


Thromboplast Time 34 


 


 


 


 24-35  SEC





 


Sodium Level 139    145  135-145  MMOL/L


 


Potassium Level 3.9    4.0  3.6-5.0  MMOL/L


 


Chloride Level 110 H   118 H   MMOL/L


 


Carbon Dioxide Level 21    19 L 21-32  MMOL/L


 


Anion Gap 8    8  5-14  MMOL/L


 


Blood Urea Nitrogen 23 H   18  7-18  MG/DL


 


Creatinine


 0.75 


 


 


 0.70 


 0.60-1.30


MG/DL


 


Estimat Glomerular Filtration


Rate > 60 


 


 


 > 60 


  





 


BUN/Creatinine Ratio 31    26   


 


Glucose Level 129 H   92    MG/DL


 


Lactic Acid Level


 0.86 


 


 


 


 0.50-2.00


MMOL/L


 


Calcium Level 8.7    8.2 L 8.5-10.1  MG/DL


 


Corrected Calcium 9.0     8.5-10.1  MG/DL


 


Magnesium Level 2.0     1.8-2.4  MG/DL


 


Total Bilirubin 0.2     0.1-1.0  MG/DL


 


Aspartate Amino Transf


(AST/SGOT) 23 


 


 


 


 5-34  U/L





 


Alanine Aminotransferase


(ALT/SGPT) 24 


 


 


 


 0-55  U/L





 


Alkaline Phosphatase 66       U/L


 


Myoglobin


 28.8 


 


 


 


 10.0-92.0


NG/ML


 


Troponin I < 0.30    < 0.30  <0.30  NG/ML


 


Total Protein 6.1 L    6.4-8.2  GM/DL


 


Albumin 3.6     3.2-4.5  GM/DL


 


Glucometer  104      MG/DL


 


Urine Color   YELLOW    


 


Urine Clarity   CLEAR    


 


Urine pH   6.5   5-9  


 


Urine Specific Gravity   1.010 L  1.016-1.022  


 


Urine Protein   NEGATIVE   NEGATIVE  


 


Urine Glucose (UA)   NEGATIVE   NEGATIVE  


 


Urine Ketones   NEGATIVE   NEGATIVE  


 


Urine Nitrite   NEGATIVE   NEGATIVE  


 


Urine Bilirubin   NEGATIVE   NEGATIVE  


 


Urine Urobilinogen   NORMAL   NORMAL  MG/DL


 


Urine Leukocyte Esterase   NEGATIVE   NEGATIVE  


 


Urine RBC (Auto)   NEGATIVE   NEGATIVE  


 


Urine RBC   NONE    /HPF


 


Urine WBC   NONE    /HPF


 


Urine Crystals   NONE    /LPF


 


Urine Bacteria   NEGATIVE    /HPF


 


Urine Casts   NONE    /LPF


 


Urine Mucus   NEGATIVE    /LPF


 


Urine Culture Indicated   NO    











Physical Exam


Vital Signs





Vital Signs - First Documented




















Capillary Refill : Less Than 3 Seconds


Height, Weight, BMI


Height: 5'1.00"


Weight: 175lbs. 6.0oz. [id number]; 32.4 BMI


Method:Stated


General Appearance:  No Apparent Distress, WD/WN


HEENT:  PERRL/EOMI, TMs Normal, Normal ENT Inspection, Pharynx Normal


Neck:  Full Range of Motion, Normal Inspection


Respiratory:  Chest Non Tender, Lungs Clear, Normal Breath Sounds, No Accessory 

Muscle Use, No Respiratory Distress; No Crackles, No Decreased Breath Sounds, 

No Expiration, No Inspiration, No Pleural Rub, No Rales, No Respiratory Distress

, No Rhonci, No Stridor, No Wheezing, No Other


Cardiovascular:  Regular Rate, Rhythm, No Edema, No Gallop, No JVD, No Murmur, 

Normal Peripheral Pulses


Gastrointestinal:  Normal Bowel Sounds, No Organomegaly


Back:  Normal Inspection, No CVA Tenderness


Extremity:  Normal Capillary Refill, Normal Inspection


Neurologic/Psychiatric:  Alert, Other (unable to provide any history)


Skin:  Normal Color, Warm/Dry





A/P-Cardiology


Admission Diagnosis


Hypotension


Sinus bradycardia


Hyperlipidemia


Depression





Assessment/Plan


Hypotension, patient has been on lisinopril, I will discontinue lisinopril for 

now and monitor blood pressure, received IV fluid and blood pressure is better 

at this time





Sinus bradycardia, asymptomatic, limited exercise ability.  Continue to monitor 

as an outpatient.





Hyperlipidemia maintained on simvastatin which might be part spading in his 

muscle weakness.  I will discontinue statin and monitor





Depression, anxiety, followed and managed by primary care physician





Diabetes mellitus, followed and managed by primary care physician division





Generalized weakness and loss of energy probably secondary to his bradycardia 

and hypotension.  At this time it is better.  Continue to monitor





Deafness, unable to communicate.





From cardiology standpoint patient can go back to his assisted facility and 

followed as an outpatient





Clinical Quality Measures


DVT/VTE Risk/Contraindication:


Risk Factor Score Per Nursin


RFS Level Per Nursing on Admit:  2=Moderate











FRANCISCO CASTELLON MD Dec 23, 2018 09:59

## 2018-12-23 NOTE — SHORT STAY SUMMARY-HOSPITALIST
History of Present Illness


HPI/Chief Complaint


CC: Hypotension with bradycardia





HPI: This is a 43-year-old white male severely disabled with mental retardation 

and deafness of Dr. Fernandez who is cared for by a caregiver who presents to 

the hospital with hypotension and bradycardia and not feeling well.  He 

underwent a complete septic workup revealing no evidence of bacterial infection 

but he was given aggressive IV fluids with improvement of hypotension of 

systolic of 80 monitored in the ICU overnight and cardiology was consulted.  

Bradycardia was not significant enough for any type of pursuant of sick sinus 

syndrome and due to bedridden state there was no indication for aggressive 

evaluation for that.  Lisinopril will be discontinued along with statin for 

hypotension and muscle weakness symptoms that he had on admission.  He has been 

approved for discharge today.


Source:  RN/MD, caregiver


Exam Limitations:  physical impairment


Date Seen


18


Time Seen by a Provider:  11:45


Attending Physician


Heather Shipley DO


PCP


Nazario Fernandez DO


Referring Physician





Date of Admission


Dec 22, 2018 at 21:00





Home Medications & Allergies


Home Medications


Reviewed patient Home Medication Reconciliation performed by pharmacy 

medication reconciliations technician and/or nursing.


Patients Allergies have been reviewed.





Allergies





Allergies


Coded Allergies


  No Known Drug Allergies ([id number]/18)








Past Medical-Social-Family Hx


Past Med/Social Hx:  Reviewed Nursing Past Med/Soc Hx, Reviewed and Corrections 

made


Patient Social History


Marrital Status:  single


Employed/Student:  unemployed


Alcohol Use:  Denies Use


Recreational Drug Use:  No


Smoking Status:  Never a Smoker


Physical Abuse Screen:  No


Sexual Abuse:  No


Recent Foreign Travel:  No


Contact w/other who traveled:  No


Recent Infectious Disease Expo:  No





Immunizations Up To Date


Tetanus Booster (TDap):  Unknown


Pediatric:  Yes


Date of Influenza Vaccine:  Oct 1, 2018





Seasonal Allergies


Seasonal Allergies:  No





Past Medical History


Gastrointestinal:  Chronic Constipation, Gall Bladder Disease


Hearing Impairment:  Deaf


History of Blood Disorders:  No


Adverse Reaction to Blood Higgins:  No





Review of Systems


ROS-Unable to Obtain:  Mental retardation and deafness


Constitutional:  see HPI





Physical Exam


Physical Exam


Vital Signs





Vital Signs - First Documented




















Capillary Refill : Less Than 3 Seconds


Height, Weight, BMI


Height: 5'1.00"


Weight: 175lbs. 6.0oz. [id number]; 32.4 BMI


Method:Stated


General Appearance:  No Apparent Distress, WD/WN, Chronically ill


Eyes:  Bilateral Eye Normal Inspection, Bilateral Eye PERRL


HEENT:  PERRL/EOMI, Normal ENT Inspection, Pharynx Normal, Other (deafness)


Neck:  Full Range of Motion, Normal Inspection, Non Tender, Supple, Carotid 

Bruit


Respiratory:  Chest Non Tender, Lungs Clear, Normal Breath Sounds, No Accessory 

Muscle Use, No Respiratory Distress


Cardiovascular:  Regular Rate, Rhythm, No Edema, No Gallop, No JVD, No Murmur, 

Normal Peripheral Pulses


Gastrointestinal:  Normal Bowel Sounds, No Organomegaly, No Pulsatile Mass, Non 

Tender, Soft


Back:  Normal Inspection, No CVA Tenderness, No Vertebral Tenderness


Extremity:  Normal Capillary Refill, Normal Inspection, Normal Range of Motion, 

Non Tender, No Calf Tenderness, No Pedal Edema


Neurologic/Psychiatric:  Alert, Disoriented, Motor Weakness, Sensory Deficit


Skin:  Normal Color, Warm/Dry


Lymphatic:  No Adenopathy





Results


Results/Procedures


Labs


Laboratory Tests


18 18:37








18 03:49








Patient resulted labs reviewed.





Short Stay Diagnosis


Discharge Diagnosis-Short Stay


Admission Diagnosis


Assessment:


Hypotension without sepsis source due to lisinopril


Bradycardia


Muscle weakness discontinued statin therapy


Mental retardation


Deafness


Final Discharge Diagnosis


Assessment:


Hypotension without sepsis source due to lisinopril


Bradycardia


Muscle weakness discontinued statin therapy


Mental retardation


Deafness





Conclusion


Plan


Plan:


Discontinuation of lisinopril and statin


Discharge home





Diagnosis/Problems


Diagnosis/Problems





(1) Hypotension


Status:  Acute


Qualifiers:  


   Qualified Codes:  I95.0 - Idiopathic hypotension


(2) Bradycardia with 41-50 beats per minute


Status:  Acute


(3) Deafness


Status:  Chronic


Qualifiers:  


   Qualified Codes:  H91.93 - Unspecified hearing loss, bilateral


(4) Mental developmental delay


Status:  Chronic





Clinical Quality Measures


DVT/VTE Risk/Contraindication:


Risk Factor Score Per Nursin


RFS Level Per Nursing on Admit:  2=Moderate











HEATHER SHIPLEY DO Dec 23, 2018 12:17

## 2019-11-15 ENCOUNTER — HOSPITAL ENCOUNTER (EMERGENCY)
Dept: HOSPITAL 75 - ER | Age: 44
Discharge: HOME | End: 2019-11-15
Payer: MEDICARE

## 2019-11-15 VITALS — DIASTOLIC BLOOD PRESSURE: 62 MMHG | SYSTOLIC BLOOD PRESSURE: 105 MMHG

## 2019-11-15 VITALS — WEIGHT: 219.8 LBS | BODY MASS INDEX: 42.04 KG/M2 | HEIGHT: 60.63 IN

## 2019-11-15 DIAGNOSIS — L02.214: Primary | ICD-10-CM

## 2019-11-15 DIAGNOSIS — F42.9: ICD-10-CM

## 2019-11-15 DIAGNOSIS — F81.9: ICD-10-CM

## 2019-11-15 DIAGNOSIS — F79: ICD-10-CM

## 2019-11-15 DIAGNOSIS — H91.3: ICD-10-CM

## 2019-11-15 PROCEDURE — 87077 CULTURE AEROBIC IDENTIFY: CPT

## 2019-11-15 PROCEDURE — 87186 SC STD MICRODIL/AGAR DIL: CPT

## 2019-11-15 PROCEDURE — 99283 EMERGENCY DEPT VISIT LOW MDM: CPT

## 2019-11-15 PROCEDURE — 87205 SMEAR GRAM STAIN: CPT

## 2019-11-15 PROCEDURE — 87070 CULTURE OTHR SPECIMN AEROBIC: CPT

## 2019-11-15 NOTE — ED INTEGUMENTARY GENERAL
General


Chief Complaint:  Skin/Wound Problems


Stated Complaint:  ABSCESS


Source:  patient


Exam Limitations:  no limitations





History of Present Illness


Date Seen by Provider:  Nov 15, 2019


Time Seen by Provider:  12:18


Initial Comments


Abscess left groin, seen by Dr. Dr. De La Cruz today and referred to the 

emergency room.


Timing/Duration:  week, getting worse


Severity:  moderate


Possible Cause:  no cause identified


Associated Symptoms:  denies symptoms





Allergies and Home Medications


Allergies


Coded Allergies:  


     No Known Drug Allergies (Unverified , 12/22/18)





Patient Home Medication List


Home Medication List Reviewed:  Yes





Review of Systems


Review of Systems


Constitutional:  see HPI; No chills, No fever


EENTM:  see HPI


Respiratory:  no symptoms reported


Cardiovascular:  no symptoms reported


Genitourinary:  no symptoms reported


Musculoskeletal:  no symptoms reported


Skin:  see HPI


Psychiatric/Neurological:  No Symptoms Reported


Endocrine:  No Symptoms Reported





Past Medical-Social-Family Hx


Patient Social History


Recent Foreign Travel:  No


Contact w/Someone Who Travel:  No





Immunizations Up To Date


Tetanus Booster (TDap):  Unknown


PED Vaccines UTD:  Yes


Date of Influenza Vaccine:  Oct 1, 2018





Seasonal Allergies


Seasonal Allergies:  No





Past Medical History


Surgeries:  Yes (GALLBLADDER )


Respiratory:  No


Cardiac:  Yes


Neurological:  Yes (OCD, MODERATE INTELLECTUAL DISABILITIES, KLIPPEL-FEIL 

SYNDROME)


Genitourinary:  No


Gastrointestinal:  Yes


Chronic Constipation, Gall Bladder Disease


Musculoskeletal:  No


Endocrine:  Yes


HEENT:  Yes


Hearing Impairment:  Deaf


Cancer:  No


Psychosocial:  Yes (PATIENT IS MENTALLY CHALLENGED, DEAF, AND MUTE)


Integumentary:  No


Blood Disorders:  No


Adverse Reaction/Blood Tranf:  No





Physical Exam


Vital Signs


Capillary Refill :


General Appearance:  WD/WN, no apparent distress


HEENT:  PERRL/EOMI, normal ENT inspection


Neck:  non-tender, full range of motion


Respiratory:  no respiratory distress, no accessory muscle use


Extremities:  normal range of motion, non-tender


Neurologic/Psychiatric:  alert, normal mood/affect, oriented x 3


Skin:  normal color, warm/dry


Skin Problem Location:  other (left mons pubis 3cm fluctuant abscess)


Skin Problem Character:  abscess





Procedures/Interventions


I&D :  


   Blade Size:  11


Progress


Anesthetized the overlying skin with 1 mL of lidocaine, incision made with 11 

blade scalpel. Minimal bloody material expressed. Culture collected and sent to 

lab. Wound not packed, cavity not large enough to accept packing..





Progress/Results/Core Measures


Results/Orders


My Orders





Orders - TODD PATEL


Wound Culture (11/15/19 12:16)


Sulfamethoxazole/Trimet Ds Tab (Bactrim (11/15/19 12:30)


Cephalexin Capsule (Keflex Capsule) (11/15/19 12:30)








Departure


Impression





   Primary Impression:  


   Abscess


   Additional Impressions:  


   Mental developmental delay


   Deafness


Disposition:  01 HOME, SELF-CARE


Condition:  Stable





Departure-Patient Inst.


Decision time for Depature:  12:20


Referrals:  


KAYLA DE LA CRUZ DO (PCP/Family)


Primary Care Physician


Patient Instructions:  Skin Abscess





Add. Discharge Instructions:  


1. Return to ER for any concerns


2. Use the gauze to collect any drainage that may leak from this in the next few

days. Antibiotic as directed. All discharge instructions reviewed with patient 

and/or family. Voiced understanding.


Scripts


Doxycycline Hyclate (Doxycycline Hyclate) 100 Mg Tablet


100 MG PO BID, #20 TAB 0 Refills


   Prov: TODD PATEL         11/15/19











TODD PATEL             Nov 15, 2019 12:23


POS

## 2020-01-22 ENCOUNTER — HOSPITAL ENCOUNTER (OUTPATIENT)
Dept: HOSPITAL 75 - PREOP | Age: 45
End: 2020-01-22
Attending: DENTIST
Payer: MEDICARE

## 2020-01-22 DIAGNOSIS — Z01.818: Primary | ICD-10-CM
